# Patient Record
Sex: FEMALE | Race: WHITE | NOT HISPANIC OR LATINO | Employment: OTHER | URBAN - METROPOLITAN AREA
[De-identification: names, ages, dates, MRNs, and addresses within clinical notes are randomized per-mention and may not be internally consistent; named-entity substitution may affect disease eponyms.]

---

## 2023-06-14 ENCOUNTER — TELEPHONE (OUTPATIENT)
Dept: UROLOGY | Facility: CLINIC | Age: 78
End: 2023-06-14
Payer: MEDICARE

## 2023-06-19 DIAGNOSIS — N28.1 CYST OF KIDNEY, ACQUIRED: Primary | ICD-10-CM

## 2023-06-21 ENCOUNTER — OFFICE VISIT (OUTPATIENT)
Dept: UROLOGY | Facility: CLINIC | Age: 78
End: 2023-06-21
Payer: MEDICARE

## 2023-06-21 VITALS — TEMPERATURE: 99 F | RESPIRATION RATE: 19 BRPM

## 2023-06-21 DIAGNOSIS — N28.1 CYST OF KIDNEY, ACQUIRED: Primary | ICD-10-CM

## 2023-06-21 PROCEDURE — 99204 PR OFFICE/OUTPT VISIT, NEW, LEVL IV, 45-59 MIN: ICD-10-PCS | Mod: S$GLB,,, | Performed by: FAMILY MEDICINE

## 2023-06-21 PROCEDURE — 99204 OFFICE O/P NEW MOD 45 MIN: CPT | Mod: S$GLB,,, | Performed by: FAMILY MEDICINE

## 2023-06-21 RX ORDER — INDAPAMIDE 2.5 MG/1
2.5 TABLET ORAL
COMMUNITY
Start: 2023-05-04

## 2023-06-21 RX ORDER — AMLODIPINE BESYLATE 5 MG/1
5 TABLET ORAL
COMMUNITY
Start: 2023-05-31

## 2023-06-21 RX ORDER — PANTOPRAZOLE SODIUM 20 MG/1
TABLET, DELAYED RELEASE ORAL
COMMUNITY
Start: 2023-03-10

## 2023-06-21 RX ORDER — FLUTICASONE FUROATE AND VILANTEROL 100; 25 UG/1; UG/1
1 POWDER RESPIRATORY (INHALATION)
COMMUNITY
Start: 2023-03-03

## 2023-06-21 RX ORDER — LOSARTAN POTASSIUM 100 MG/1
100 TABLET ORAL
COMMUNITY
Start: 2023-06-13

## 2023-06-21 RX ORDER — DICLOFENAC SODIUM 50 MG/1
50 TABLET, DELAYED RELEASE ORAL 2 TIMES DAILY
COMMUNITY

## 2023-06-21 RX ORDER — RIVAROXABAN 15 MG/1
15 TABLET, FILM COATED ORAL
COMMUNITY
Start: 2023-05-17

## 2023-06-21 RX ORDER — AMITRIPTYLINE HYDROCHLORIDE 10 MG/1
10 TABLET, FILM COATED ORAL NIGHTLY
COMMUNITY
Start: 2023-06-13

## 2023-06-21 RX ORDER — AMIODARONE HYDROCHLORIDE 200 MG/1
200 TABLET ORAL
COMMUNITY
Start: 2023-04-22

## 2023-06-21 RX ORDER — METOPROLOL TARTRATE 50 MG/1
50 TABLET ORAL 2 TIMES DAILY
COMMUNITY
Start: 2023-05-25

## 2023-06-21 RX ORDER — LORAZEPAM 1 MG/1
1 TABLET ORAL NIGHTLY PRN
COMMUNITY
Start: 2023-06-14

## 2023-06-21 RX ORDER — LEVOTHYROXINE SODIUM 50 UG/1
50 TABLET ORAL EVERY MORNING
COMMUNITY
Start: 2023-05-25

## 2023-06-21 NOTE — PROGRESS NOTES
Subjective:       Patient ID: Lois Esposito is a 77 y.o. female.    Chief Complaint: KIDNEY CYST      HPI: 77-year-old female patient presenting to the clinic to establish care for further evaluation and management complex renal cyst.  She was a former patient of Dr. Blum and was getting routine imaging to monitor the cyst.        Past Medical History:   Past Medical History:   Diagnosis Date    A-fib     COPD (chronic obstructive pulmonary disease)     Hypertension        Past Surgical Historical:   Past Surgical History:   Procedure Laterality Date    APPENDECTOMY      HEART TUMOR EXCISION      HYSTERECTOMY      REPLACEMENT OF PACEMAKER          Medications:   Medication List with Changes/Refills   Current Medications    AMIODARONE (PACERONE) 200 MG TAB    Take 200 mg by mouth.    AMITRIPTYLINE (ELAVIL) 10 MG TABLET    Take 10 mg by mouth every evening.    AMLODIPINE (NORVASC) 5 MG TABLET    Take 5 mg by mouth.    DICLOFENAC (VOLTAREN) 50 MG EC TABLET    Take 50 mg by mouth 2 (two) times daily.    FLUTICASONE FUROATE-VILANTEROL (BREO) 100-25 MCG/DOSE DISKUS INHALER    1 puff.    INDAPAMIDE (LOZOL) 2.5 MG TAB    Take 2.5 mg by mouth.    LEVOTHYROXINE (SYNTHROID) 50 MCG TABLET    Take 50 mcg by mouth every morning.    LORAZEPAM (ATIVAN) 1 MG TABLET    Take 1 mg by mouth nightly as needed.    LOSARTAN (COZAAR) 100 MG TABLET    Take 100 mg by mouth.    METOPROLOL TARTRATE (LOPRESSOR) 50 MG TABLET    Take 50 mg by mouth 2 (two) times daily.    PANTOPRAZOLE (PROTONIX) 20 MG TABLET    TAKE 1 TABLET BY MOUTH ONCE DAILY ON AN EMPTY STOMACH 30 MINUTES PRIOR TO FIRST MEAL OF THE DAY    XARELTO 15 MG TAB    Take 15 mg by mouth.        Past Social History:   Social History     Socioeconomic History    Marital status:    Tobacco Use    Smoking status: Former     Types: Cigarettes    Smokeless tobacco: Never   Substance and Sexual Activity    Alcohol use: Not Currently    Drug use: Never    Sexual activity: Not  Currently       Allergies:   Review of patient's allergies indicates:   Allergen Reactions    Cipro [ciprofloxacin hcl]     Claritin [loratadine]         Family History:   Family History   Problem Relation Age of Onset    Kidney cancer Brother         Review of Systems:  Review of Systems   Constitutional:  Negative for activity change, appetite change, chills, diaphoresis, fatigue, fever and unexpected weight change.   HENT:  Negative for congestion, dental problem, drooling, ear discharge, ear pain, facial swelling, hearing loss, mouth sores, nosebleeds, postnasal drip, rhinorrhea, sinus pressure, sinus pain, sneezing, sore throat, tinnitus, trouble swallowing and voice change.    Eyes:  Negative for photophobia, pain, discharge, redness, itching and visual disturbance.   Respiratory:  Negative for apnea, cough, choking, chest tightness, shortness of breath, wheezing and stridor.    Cardiovascular:  Negative for chest pain and leg swelling.   Gastrointestinal:  Negative for abdominal distention, abdominal pain, anal bleeding, blood in stool, constipation, diarrhea, nausea, rectal pain and vomiting.   Endocrine: Negative for cold intolerance, heat intolerance, polydipsia, polyphagia and polyuria.   Genitourinary: Negative.  Negative for decreased urine volume, difficulty urinating, dyspareunia, dysuria, enuresis, flank pain, frequency, genital sores, hematuria, menstrual problem, pelvic pain, urgency, vaginal bleeding, vaginal discharge and vaginal pain.   Musculoskeletal:  Negative for arthralgias, back pain, gait problem, joint swelling, myalgias, neck pain and neck stiffness.   Skin:  Negative for color change, pallor, rash and wound.   Allergic/Immunologic: Negative for environmental allergies, food allergies and immunocompromised state.   Neurological:  Negative for dizziness, tremors, seizures, syncope, facial asymmetry, speech difficulty, weakness, light-headedness, numbness and headaches.   Hematological:   Negative for adenopathy. Does not bruise/bleed easily.   Psychiatric/Behavioral:  Negative for agitation, behavioral problems, confusion, decreased concentration, dysphoric mood, hallucinations, self-injury, sleep disturbance and suicidal ideas. The patient is not nervous/anxious and is not hyperactive.      Physical Exam:  Physical Exam  Exam conducted with a chaperone present.   Constitutional:       Appearance: Normal appearance.   HENT:      Head: Normocephalic.      Nose: Nose normal.      Mouth/Throat:      Mouth: Mucous membranes are moist.      Pharynx: Oropharynx is clear.   Eyes:      Extraocular Movements: Extraocular movements intact.      Conjunctiva/sclera: Conjunctivae normal.      Pupils: Pupils are equal, round, and reactive to light.   Cardiovascular:      Rate and Rhythm: Normal rate and regular rhythm.      Pulses: Normal pulses.      Heart sounds: Normal heart sounds.   Pulmonary:      Effort: Pulmonary effort is normal.      Breath sounds: Normal breath sounds.   Abdominal:      General: Abdomen is flat. Bowel sounds are normal.      Palpations: Abdomen is soft.      Hernia: There is no hernia in the left inguinal area or right inguinal area.   Genitourinary:     General: Normal vulva.      Pubic Area: No rash or pubic lice.       Labia:         Right: No rash, tenderness, lesion or injury.         Left: No rash, tenderness, lesion or injury.       Urethra: No prolapse, urethral pain, urethral swelling or urethral lesion.      Rectum: Normal.   Musculoskeletal:         General: Normal range of motion.      Cervical back: Normal range of motion and neck supple.   Lymphadenopathy:      Lower Body: No right inguinal adenopathy. No left inguinal adenopathy.   Skin:     General: Skin is warm and dry.      Capillary Refill: Capillary refill takes less than 2 seconds.   Neurological:      General: No focal deficit present.      Mental Status: She is alert and oriented to person, place, and time.  Mental status is at baseline.   Psychiatric:         Mood and Affect: Mood normal.         Behavior: Behavior normal.         Thought Content: Thought content normal.         Judgment: Judgment normal.       Assessment/Plan:       We will complete renal mass protocol to obtain baseline imaging.  Patient brings reports from previous imaging done that notes complex cystic lesion of the right kidney measuring 4.9 x 5.4x 5.5 cm.  Patient would like to establish care with our clinic for further monitoring and management      Problem List Items Addressed This Visit    None  Visit Diagnoses       Cyst of kidney, acquired    -  Primary    Relevant Orders    CT Abdomen Pelvis W Wo Contrast

## 2023-07-07 ENCOUNTER — TELEPHONE (OUTPATIENT)
Dept: UROLOGY | Facility: CLINIC | Age: 78
End: 2023-07-07
Payer: MEDICARE

## 2023-07-07 DIAGNOSIS — N28.1 CYST OF KIDNEY, ACQUIRED: Primary | ICD-10-CM

## 2023-07-07 NOTE — TELEPHONE ENCOUNTER
SPOKE WITH PT AND informed of CT results. Pt would like yearly CT renal mass. Spoke with Mary and she is fine with this. I have placed order. I have sent CT results to her PCP(Henry Wilhelm) and pulmonologist(Emmanuel Galvan)

## 2023-10-05 ENCOUNTER — TELEPHONE (OUTPATIENT)
Dept: UROLOGY | Facility: CLINIC | Age: 78
End: 2023-10-05
Payer: MEDICARE

## 2023-10-05 DIAGNOSIS — N28.1 CYST OF KIDNEY, ACQUIRED: Primary | ICD-10-CM

## 2023-10-05 NOTE — TELEPHONE ENCOUNTER
Pt called stating at her yearly imaging she cannot receive contrast per her nephrologist. We will cancel CT and order renal US.     ----- Message from Aisha Cordon sent at 10/5/2023  1:14 PM CDT -----  Contact: self  Type:  Patient Returning Call    Who Called:Lois Esposito  Who Left Message for Patient:unsure  Does the patient know what this is regarding?:results of Ct Scan  Would the patient rather a call back or a response via [facility]?   Best Call Back Number:[phone]  Additional Information: n/a

## 2024-06-28 ENCOUNTER — TELEPHONE (OUTPATIENT)
Dept: UROLOGY | Facility: CLINIC | Age: 79
End: 2024-06-28
Payer: MEDICARE

## 2024-06-28 NOTE — TELEPHONE ENCOUNTER
Attempted to return call, left  with radiology, left number on     ----- Message from Danette Sánchez sent at 6/28/2024  2:23 PM CDT -----  Contact: Lois  Patient is calling to speak with someone regarding imaging. Patient reports scheduled for CT scan and Ultrasound on Monday and request to cancel the CT scan. Please give patient a call back at [phone], if necessary.   Thank you,  GH

## 2024-07-10 ENCOUNTER — TELEPHONE (OUTPATIENT)
Dept: UROLOGY | Facility: CLINIC | Age: 79
End: 2024-07-10
Payer: MEDICARE

## 2024-07-10 NOTE — TELEPHONE ENCOUNTER
Spoke with pt and scheduled aaliyah to discuss results per Mary Hussein NP    ----- Message from Mary Hussein NP sent at 7/10/2024  3:55 PM CDT -----  Please schedule patient's follow up appointment to review results

## 2024-07-11 ENCOUNTER — OFFICE VISIT (OUTPATIENT)
Dept: UROLOGY | Facility: CLINIC | Age: 79
End: 2024-07-11
Payer: MEDICARE

## 2024-07-11 VITALS
BODY MASS INDEX: 21.16 KG/M2 | WEIGHT: 115 LBS | SYSTOLIC BLOOD PRESSURE: 141 MMHG | HEIGHT: 62 IN | HEART RATE: 66 BPM | DIASTOLIC BLOOD PRESSURE: 55 MMHG

## 2024-07-11 DIAGNOSIS — N28.1 CYST OF KIDNEY, ACQUIRED: Primary | ICD-10-CM

## 2024-07-11 PROCEDURE — 99214 OFFICE O/P EST MOD 30 MIN: CPT | Mod: S$GLB,,, | Performed by: UROLOGY

## 2024-07-11 RX ORDER — TRAZODONE HYDROCHLORIDE 50 MG/1
TABLET ORAL
COMMUNITY
Start: 2024-06-13

## 2024-07-11 RX ORDER — CALCITRIOL 0.25 UG/1
0.25 CAPSULE ORAL
COMMUNITY
Start: 2024-05-08

## 2024-07-11 NOTE — PROGRESS NOTES
Subjective:       Patient ID: Lois Esposito is a 78 y.o. female.    Chief Complaint: kidney cyst      HPI: 78-year-old female patient presenting to the clinic to establish care for further evaluation and management complex renal cyst.  She was a former patient of Dr. Blum and was getting routine imaging to monitor the cyst.  She was here for the results of her most recent CT scan which note a Bosniak 2 cyst.  No other abnormals         Past Medical History:   Past Medical History:   Diagnosis Date    A-fib     COPD (chronic obstructive pulmonary disease)     Hypertension        Past Surgical Historical:   Past Surgical History:   Procedure Laterality Date    APPENDECTOMY      HEART TUMOR EXCISION      HYSTERECTOMY      REPLACEMENT OF PACEMAKER          Medications:   Medication List with Changes/Refills   Current Medications    AMIODARONE (PACERONE) 200 MG TAB    Take 200 mg by mouth.    AMITRIPTYLINE (ELAVIL) 10 MG TABLET    Take 10 mg by mouth every evening.    AMLODIPINE (NORVASC) 5 MG TABLET    Take 5 mg by mouth.    CALCITRIOL (ROCALTROL) 0.25 MCG CAP    Take 0.25 mcg by mouth.    DICLOFENAC (VOLTAREN) 50 MG EC TABLET    Take 50 mg by mouth 2 (two) times daily.    FLUTICASONE FUROATE-VILANTEROL (BREO) 100-25 MCG/DOSE DISKUS INHALER    1 puff.    INDAPAMIDE (LOZOL) 2.5 MG TAB    Take 2.5 mg by mouth.    LEVOTHYROXINE (SYNTHROID) 50 MCG TABLET    Take 50 mcg by mouth every morning.    LORAZEPAM (ATIVAN) 1 MG TABLET    Take 1 mg by mouth nightly as needed.    LOSARTAN (COZAAR) 100 MG TABLET    Take 100 mg by mouth.    METOPROLOL TARTRATE (LOPRESSOR) 50 MG TABLET    Take 50 mg by mouth 2 (two) times daily.    PANTOPRAZOLE (PROTONIX) 20 MG TABLET    TAKE 1 TABLET BY MOUTH ONCE DAILY ON AN EMPTY STOMACH 30 MINUTES PRIOR TO FIRST MEAL OF THE DAY    TRAZODONE (DESYREL) 50 MG TABLET        XARELTO 15 MG TAB    Take 15 mg by mouth.        Past Social History:   Social History     Socioeconomic History    Marital  status:    Tobacco Use    Smoking status: Former     Types: Cigarettes    Smokeless tobacco: Never   Substance and Sexual Activity    Alcohol use: Not Currently    Drug use: Never    Sexual activity: Not Currently       Allergies:   Review of patient's allergies indicates:   Allergen Reactions    Cipro [ciprofloxacin hcl]     Claritin [loratadine]         Family History:   Family History   Problem Relation Name Age of Onset    Kidney cancer Brother          Review of Systems:  Review of Systems   Constitutional:  Negative for activity change, appetite change, chills, diaphoresis, fatigue, fever and unexpected weight change.   HENT:  Negative for congestion, dental problem, drooling, ear discharge, ear pain, facial swelling, hearing loss, mouth sores, nosebleeds, postnasal drip, rhinorrhea, sinus pressure, sinus pain, sneezing, sore throat, tinnitus, trouble swallowing and voice change.    Eyes:  Negative for photophobia, pain, discharge, redness, itching and visual disturbance.   Respiratory:  Negative for apnea, cough, choking, chest tightness, shortness of breath, wheezing and stridor.    Cardiovascular:  Negative for chest pain and leg swelling.   Gastrointestinal:  Negative for abdominal distention, abdominal pain, anal bleeding, blood in stool, constipation, diarrhea, nausea, rectal pain and vomiting.   Endocrine: Negative for cold intolerance, heat intolerance, polydipsia, polyphagia and polyuria.   Genitourinary: Negative.  Negative for decreased urine volume, difficulty urinating, dyspareunia, dysuria, enuresis, flank pain, frequency, genital sores, hematuria, menstrual problem, pelvic pain, urgency, vaginal bleeding, vaginal discharge and vaginal pain.   Musculoskeletal:  Negative for arthralgias, back pain, gait problem, joint swelling, myalgias, neck pain and neck stiffness.   Skin:  Negative for color change, pallor, rash and wound.   Allergic/Immunologic: Negative for environmental allergies,  food allergies and immunocompromised state.   Neurological:  Negative for dizziness, tremors, seizures, syncope, facial asymmetry, speech difficulty, weakness, light-headedness, numbness and headaches.   Hematological:  Negative for adenopathy. Does not bruise/bleed easily.   Psychiatric/Behavioral:  Negative for agitation, behavioral problems, confusion, decreased concentration, dysphoric mood, hallucinations, self-injury, sleep disturbance and suicidal ideas. The patient is not nervous/anxious and is not hyperactive.        Physical Exam:  Physical Exam  Exam conducted with a chaperone present.   Constitutional:       Appearance: Normal appearance.   HENT:      Head: Normocephalic.      Nose: Nose normal.      Mouth/Throat:      Mouth: Mucous membranes are moist.      Pharynx: Oropharynx is clear.   Eyes:      Extraocular Movements: Extraocular movements intact.      Conjunctiva/sclera: Conjunctivae normal.      Pupils: Pupils are equal, round, and reactive to light.   Cardiovascular:      Rate and Rhythm: Normal rate and regular rhythm.      Pulses: Normal pulses.      Heart sounds: Normal heart sounds.   Pulmonary:      Effort: Pulmonary effort is normal.      Breath sounds: Normal breath sounds.   Abdominal:      General: Abdomen is flat. Bowel sounds are normal.      Palpations: Abdomen is soft.      Hernia: There is no hernia in the left inguinal area or right inguinal area.   Genitourinary:     General: Normal vulva.      Pubic Area: No rash or pubic lice.       Labia:         Right: No rash, tenderness, lesion or injury.         Left: No rash, tenderness, lesion or injury.       Urethra: No prolapse, urethral pain, urethral swelling or urethral lesion.      Rectum: Normal.   Musculoskeletal:         General: Normal range of motion.      Cervical back: Normal range of motion and neck supple.   Lymphadenopathy:      Lower Body: No right inguinal adenopathy. No left inguinal adenopathy.   Skin:     General:  Skin is warm and dry.      Capillary Refill: Capillary refill takes less than 2 seconds.   Neurological:      General: No focal deficit present.      Mental Status: She is alert and oriented to person, place, and time. Mental status is at baseline.   Psychiatric:         Mood and Affect: Mood normal.         Behavior: Behavior normal.         Thought Content: Thought content normal.         Judgment: Judgment normal.         Assessment/Plan:       Bosniak 2 renal cysts:  Notify patient that no additional follow up was needed.  Thorough discussion about renal cysts.  Reassured the patient this is a benign finding.  Follow up as needed      Problem List Items Addressed This Visit    None